# Patient Record
(demographics unavailable — no encounter records)

---

## 2017-09-28 NOTE — REP
MAXILLOFACIAL CT WITHOUT CONTRAST:

 

HISTORY: Chronic maxillary sinusitis.

 

Minimal mucosal thickening is present in the right maxillary sinus. A retention

cyst or polyp is present in the right maxillary sinus. The remaining sinuses are

clear. The ostiomeatal units are patent. The middle and inferior nasal turbinates

are partially paradoxical. There is randi bullosa of the left middle nasal

turbinate. There is minimal deviation of the nasal septum to the left anteriorly

and to the right posteriorly. The cribriform plate, medial walls of the orbits

and optic canals are intact. The carotid canals form a segment of the

posterolateral walls of the sphenoid sinus.

 

IMPRESSION:

 

1. Sinus mucosal thickening as described above.

 

2. Right maxillary sinus retention cyst or polyp.

 

 

Signed by

Sky Maradiaga MD 09/29/2017 09:27 A

## 2021-12-16 NOTE — REP
INDICATION:

Dense breast



COMPARISON:

None



TECHNIQUE:

Left whole breast ultrasound was performed.



FINDINGS:

There are no cystic or solid masses in the left breast.



IMPRESSION:

Normal left breast ultrasound.



BI-RADS: Category 1: Negative.



RECOMMENDATION:

Follow-up as clinically warranted.





<Electronically signed by Kyle Alcantar > 12/16/21 [id number]